# Patient Record
Sex: FEMALE | Race: WHITE | NOT HISPANIC OR LATINO
[De-identification: names, ages, dates, MRNs, and addresses within clinical notes are randomized per-mention and may not be internally consistent; named-entity substitution may affect disease eponyms.]

---

## 2022-03-28 ENCOUNTER — APPOINTMENT (OUTPATIENT)
Dept: PLASTIC SURGERY | Facility: CLINIC | Age: 72
End: 2022-03-28
Payer: MEDICARE

## 2022-03-28 VITALS
SYSTOLIC BLOOD PRESSURE: 126 MMHG | OXYGEN SATURATION: 96 % | HEART RATE: 80 BPM | WEIGHT: 113 LBS | DIASTOLIC BLOOD PRESSURE: 78 MMHG | BODY MASS INDEX: 19.29 KG/M2 | RESPIRATION RATE: 20 BRPM | HEIGHT: 64 IN | TEMPERATURE: 98.3 F

## 2022-03-28 DIAGNOSIS — S81.859A OPEN BITE, UNSPECIFIED LOWER LEG, INITIAL ENCOUNTER: ICD-10-CM

## 2022-03-28 DIAGNOSIS — Z86.69 PERSONAL HISTORY OF OTHER DISEASES OF THE NERVOUS SYSTEM AND SENSE ORGANS: ICD-10-CM

## 2022-03-28 DIAGNOSIS — Z86.2 PERSONAL HISTORY OF DISEASES OF THE BLOOD AND BLOOD-FORMING ORGANS AND CERTAIN DISORDERS INVOLVING THE IMMUNE MECHANISM: ICD-10-CM
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
## 2022-03-28 PROBLEM — Z00.00 ENCOUNTER FOR PREVENTIVE HEALTH EXAMINATION: Status: ACTIVE | Noted: 2022-03-28

## 2022-03-28 PROCEDURE — 99203 OFFICE O/P NEW LOW 30 MIN: CPT

## 2022-03-28 RX ORDER — CYANOCOBALAMIN (VITAMIN B-12) 100 MCG
TABLET ORAL
Refills: 0 | Status: ACTIVE | COMMUNITY

## 2022-03-28 RX ORDER — CLINDAMYCIN HYDROCHLORIDE 300 MG/1
300 CAPSULE ORAL
Refills: 0 | Status: ACTIVE | COMMUNITY

## 2022-03-28 RX ORDER — VIT C/E/ZN/COPPR/LUTEIN/ZEAXAN 250MG-90MG
CAPSULE ORAL
Refills: 0 | Status: ACTIVE | COMMUNITY

## 2022-03-28 RX ORDER — PSYLLIUM HUSK 0.4 G
1000-800 CAPSULE ORAL
Refills: 0 | Status: ACTIVE | COMMUNITY

## 2022-03-28 RX ORDER — UBIDECARENONE/VIT E ACET 100MG-5
1000 CAPSULE ORAL
Refills: 0 | Status: ACTIVE | COMMUNITY

## 2022-03-28 NOTE — HISTORY OF PRESENT ILLNESS
[FreeTextEntry1] : One week ago, patient startled her own dog with multiple small bites to the face, left ear and left arm.  Seen in ER and following irrigation suture repair 5 areas, tetanus prophylaxis given, and also given one week course of Clindamycin which she has completed.  \par Feels well no complaints

## 2022-03-28 NOTE — REASON FOR VISIT
[Initial Evaluation] : an initial evaluation [FreeTextEntry1] : Patient referred for evaluation one week following dog bite with multiple sutured lacerations

## 2022-03-28 NOTE — PHYSICAL EXAM
[NI] : Normal [de-identified] : laceration left and right brow with no nerve injury\par Well approximated with no erythema or purulence  [de-identified] : laerations 1.5 cm left tragal and helical rim, Prolene suture repair\par well approximated, no erythema or purulence\par No palpable adenopathy  [de-identified] : left hand laceration with suture repair\par healing no erythema or purulence and no palpable adenopathy

## 2022-03-28 NOTE — ASSESSMENT
[FreeTextEntry1] : A:\par One week following multiple small bites form her dog, doing well with ni evidence of infection \par P:\par Sutures removed and scar care reviewed\par Follow in three months or sooner as needed

## 2022-06-08 ENCOUNTER — APPOINTMENT (OUTPATIENT)
Dept: PLASTIC SURGERY | Facility: CLINIC | Age: 72
End: 2022-06-08
Payer: MEDICARE

## 2022-06-08 VITALS
OXYGEN SATURATION: 97 % | RESPIRATION RATE: 20 BRPM | SYSTOLIC BLOOD PRESSURE: 130 MMHG | DIASTOLIC BLOOD PRESSURE: 76 MMHG | TEMPERATURE: 98.3 F | HEART RATE: 69 BPM

## 2022-06-08 DIAGNOSIS — S01.80XD UNSPECIFIED OPEN WOUND OF OTHER PART OF HEAD, SUBSEQUENT ENCOUNTER: ICD-10-CM

## 2022-06-08 PROCEDURE — 99212 OFFICE O/P EST SF 10 MIN: CPT

## 2022-06-08 NOTE — HISTORY OF PRESENT ILLNESS
[FreeTextEntry1] : PPatient is also bothered by skin excess in the neck, and interested in temporal lift

## 2022-06-08 NOTE — PHYSICAL EXAM
[NI] : Normal [de-identified] : well healed scars from lacerations\par facial rhytids with skin laxity in the neck

## 2022-06-08 NOTE — ASSESSMENT
[FreeTextEntry1] : A;\par DOing well following repair of facial lacerations\par scars generally falt with good color\par Facial rhytids with skin laxity in the neck\par P;\par We discussed scar therapy\par Patient also wished to discuss temporal lift for facial rhytids and laxity in the neck. Reviewed the material risks,  benefits,  and alternatives with Ms. GUILLERMO CARRASCO  , including no surgery, and she  understands and wishes to proceed.\par

## 2022-06-08 NOTE — REASON FOR VISIT
[Follow-Up: _____] : a [unfilled] follow-up visit [FreeTextEntry1] : Ms. GUILLERMO CARRASCO returns for a follow up visit, generally doing well with no complaints and no fevers or chills at home, healing well at 3 months